# Patient Record
Sex: FEMALE | Race: WHITE | NOT HISPANIC OR LATINO
[De-identification: names, ages, dates, MRNs, and addresses within clinical notes are randomized per-mention and may not be internally consistent; named-entity substitution may affect disease eponyms.]

---

## 2020-02-18 ENCOUNTER — TRANSCRIPTION ENCOUNTER (OUTPATIENT)
Age: 55
End: 2020-02-18

## 2023-11-27 ENCOUNTER — EMERGENCY (EMERGENCY)
Facility: HOSPITAL | Age: 58
LOS: 1 days | Discharge: HOME | End: 2023-11-27
Attending: STUDENT IN AN ORGANIZED HEALTH CARE EDUCATION/TRAINING PROGRAM
Payer: COMMERCIAL

## 2023-11-27 VITALS
HEIGHT: 61 IN | TEMPERATURE: 98 F | WEIGHT: 100.09 LBS | HEART RATE: 94 BPM | DIASTOLIC BLOOD PRESSURE: 87 MMHG | RESPIRATION RATE: 16 BRPM | SYSTOLIC BLOOD PRESSURE: 134 MMHG | OXYGEN SATURATION: 94 %

## 2023-11-27 VITALS
HEART RATE: 92 BPM | OXYGEN SATURATION: 98 % | SYSTOLIC BLOOD PRESSURE: 128 MMHG | TEMPERATURE: 98 F | RESPIRATION RATE: 18 BRPM | DIASTOLIC BLOOD PRESSURE: 78 MMHG

## 2023-11-27 PROCEDURE — 73120 X-RAY EXAM OF HAND: CPT | Mod: 26,RT

## 2023-11-27 PROCEDURE — 99283 EMERGENCY DEPT VISIT LOW MDM: CPT

## 2023-11-27 PROCEDURE — 73120 X-RAY EXAM OF HAND: CPT

## 2023-11-27 PROCEDURE — 99283 EMERGENCY DEPT VISIT LOW MDM: CPT | Mod: 25

## 2023-11-27 NOTE — ED PROVIDER NOTE - ATTENDING APP SHARED VISIT CONTRIBUTION OF CARE
Eric Michaels DO: I have personally performed a face to face medical and diagnostic evaluation of the patient. I have discussed with and reviewed the Resident's and/or ACP's and/or Medical/PA/NP student's note and agree with the History, ROS, Physical Exam and MDM unless otherwise indicated. A brief summary of my personal evaluation and impression can be found below.     59 y/o female, hx of HLD, raynaud phenomenon presents to the ER with complaint of right second finger discoloration pt L hand dominant. states this morning at around 8am noted her finger to be purple in color. states felt as if her circulation was being cut off. states as day went on discoloration resolved. states at this time has minimal discoloration. states spoke with her pcp and advised to go to the ER for evaluation. denies fall or trauma. denies hitting her finger. denies numbness or tingling. denies pain at this time. denies f/n/v/d, CP, SOB, HA, dizziness, abdominal pain, urinary symptoms. Pt also noted small punctate area of purple on flexor surface of elbow    CONSTITUTIONAL: NAD  SKIN: R 2nd digit middle phalanx purple on palmar side   HEAD: NCAT  EYES: EOMI, PERRLA, no scleral icterus, conjunctiva pink  NECK: Supple; non tender. Full ROM.  CARD: RRR  RESP: No respiratory distress  ABD: non-distended  MSK: R 2nd digit middle phalanx purple on palmar side, distal cap refill < 2 sec, full rom, full strength in flexion and extension, no tenderness to palpation, compartments soft. small purple 1mm dot over flexor surface of elbow, full elbow rom w/ no erythema or swelling. distal sensation and motor in tact.  PSYCH: Cooperative, appropriate.     Likely reynauds vs possible ruptured blood vessel in finger, currently no for elevated compartment pressures, no concern for vascular or nervous injury, although symptoms earlier in the day could suggest possible elevated compartment pressure which resolved. will get xray finger, if unremarkable anticipate discharge with hand followup.

## 2023-11-27 NOTE — ED ADULT NURSE NOTE - OBJECTIVE STATEMENT
57 yo FM AOx4 from home with PMH of CAD (on aspirin) and HDL c/o right second finger discoloration and tightness. Pt states 8 am pt endorses finger to be "bluish-purple". Pt states "it felt like circulation was being cut off". Pt states as day went on discoloration resolved but was advised by PCP prompting arrival to [facility] ED. Pt denies trauma to finger and denies fall. PMS noted to right second finger. Pt initially presents to [facility] ED in no acute distress with unlabored breathing. Pt abdomen soft and nondistended. Call bell in reach. Stretcher in lowest position locked with blanket given. Comfort care and safety measures provided. Pt denies c/p, sob, abd pain, N/V/D, fevers, chills, headache, dizziness, dysuria, blood in urine and stool.

## 2023-11-27 NOTE — ED PROVIDER NOTE - NSFOLLOWUPINSTRUCTIONS_ED_ALL_ED_FT
You were seen in the Emergency Department for finger discoloration.    Follow up with a hand doctor if symptoms return. Their information has been attached.    If you have fever, chills, nausea, vomiting, new or worsening pain, or if you have any new symptoms return to the Emergency Department.

## 2023-11-27 NOTE — ED PROVIDER NOTE - CARE PROVIDER_API CALL
Kaylyn Bennett  [facility]  [address], [address]  [place] [address]  Phone: [phone]  Fax: [phone]  Follow Up Time:

## 2023-11-27 NOTE — ED PROVIDER NOTE - PHYSICAL EXAMINATION
skin: skin: right hand-second digit cap refill tact. ecchymosis noted between DIP and PIP. no ttp of the area. full sensation intact.

## 2023-11-27 NOTE — ED PROVIDER NOTE - PROGRESS NOTE DETAILS
Eric Michaels, DO: xray unremarkable, pt has no pain, full rom full strength no other symptoms at this time, ok for dc w/ followup home

## 2023-11-27 NOTE — ED PROVIDER NOTE - PATIENT PORTAL LINK FT
You can access the FollowMyHealth Patient Portal offered by [facility] by registering at the following website: [email or web address]. By joining [employer]’s FollowMyHealth portal, you will also be able to view your health information using other applications (apps) compatible with our system.

## 2023-11-27 NOTE — ED PROVIDER NOTE - OBJECTIVE STATEMENT
57 y/o female, hx of HLD, presents to the ER with complaint of right second finger discoloration. states this morning at around 8am noted her finger to be purple in color. states felt as if her circulation was being cut off. states as day went on discoloration resolved. states at this time has minimal discoloration. states spoke with her pcp and advised to go to the ER for evaluation. denies fall or trauma. denies hitting her finger. denies numbness or tingling. denies pain at this time. denies f/n/v/d, CP, SOB, HA, dizziness, abdominal pain, urinary symptoms.

## 2023-11-27 NOTE — ED ADULT NURSE NOTE - NSFALLUNIVINTERV_ED_ALL_ED
Bed/Stretcher in lowest position, wheels locked, appropriate side rails in place/Call bell, personal items and telephone in reach/Instruct patient to call for assistance before getting out of bed/chair/stretcher/Non-slip footwear applied when patient is off stretcher/[place] to call system/Physically safe environment - no spills, clutter or unnecessary equipment/Purposeful proactive rounding/Room/bathroom lighting operational, light cord in reach